# Patient Record
Sex: FEMALE | Race: WHITE | NOT HISPANIC OR LATINO | Employment: STUDENT | URBAN - METROPOLITAN AREA
[De-identification: names, ages, dates, MRNs, and addresses within clinical notes are randomized per-mention and may not be internally consistent; named-entity substitution may affect disease eponyms.]

---
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
## 2022-01-13 ENCOUNTER — PATIENT MESSAGE (OUTPATIENT)
Dept: ADMINISTRATIVE | Facility: OTHER | Age: 22
End: 2022-01-13
Payer: COMMERCIAL

## 2022-01-28 ENCOUNTER — OFFICE VISIT (OUTPATIENT)
Dept: PRIMARY CARE CLINIC | Facility: CLINIC | Age: 22
End: 2022-01-28
Payer: COMMERCIAL

## 2022-01-28 VITALS
RESPIRATION RATE: 17 BRPM | SYSTOLIC BLOOD PRESSURE: 105 MMHG | BODY MASS INDEX: 21.8 KG/M2 | HEIGHT: 67 IN | TEMPERATURE: 98 F | HEART RATE: 72 BPM | DIASTOLIC BLOOD PRESSURE: 70 MMHG | WEIGHT: 138.88 LBS | OXYGEN SATURATION: 97 %

## 2022-01-28 DIAGNOSIS — Z00.00 ANNUAL PHYSICAL EXAM: Primary | ICD-10-CM

## 2022-01-28 PROCEDURE — 99999 PR PBB SHADOW E&M-EST. PATIENT-LVL IV: ICD-10-PCS | Mod: PBBFAC,,, | Performed by: FAMILY MEDICINE

## 2022-01-28 PROCEDURE — 3074F SYST BP LT 130 MM HG: CPT | Mod: CPTII,S$GLB,, | Performed by: FAMILY MEDICINE

## 2022-01-28 PROCEDURE — 3074F PR MOST RECENT SYSTOLIC BLOOD PRESSURE < 130 MM HG: ICD-10-PCS | Mod: CPTII,S$GLB,, | Performed by: FAMILY MEDICINE

## 2022-01-28 PROCEDURE — 3008F PR BODY MASS INDEX (BMI) DOCUMENTED: ICD-10-PCS | Mod: CPTII,S$GLB,, | Performed by: FAMILY MEDICINE

## 2022-01-28 PROCEDURE — 99999 PR PBB SHADOW E&M-EST. PATIENT-LVL IV: CPT | Mod: PBBFAC,,, | Performed by: FAMILY MEDICINE

## 2022-01-28 PROCEDURE — 1159F MED LIST DOCD IN RCRD: CPT | Mod: CPTII,S$GLB,, | Performed by: FAMILY MEDICINE

## 2022-01-28 PROCEDURE — 3078F DIAST BP <80 MM HG: CPT | Mod: CPTII,S$GLB,, | Performed by: FAMILY MEDICINE

## 2022-01-28 PROCEDURE — 3078F PR MOST RECENT DIASTOLIC BLOOD PRESSURE < 80 MM HG: ICD-10-PCS | Mod: CPTII,S$GLB,, | Performed by: FAMILY MEDICINE

## 2022-01-28 PROCEDURE — 99385 PR PREVENTIVE VISIT,NEW,18-39: ICD-10-PCS | Mod: S$GLB,,, | Performed by: FAMILY MEDICINE

## 2022-01-28 PROCEDURE — 1159F PR MEDICATION LIST DOCUMENTED IN MEDICAL RECORD: ICD-10-PCS | Mod: CPTII,S$GLB,, | Performed by: FAMILY MEDICINE

## 2022-01-28 PROCEDURE — 3008F BODY MASS INDEX DOCD: CPT | Mod: CPTII,S$GLB,, | Performed by: FAMILY MEDICINE

## 2022-01-28 PROCEDURE — 99385 PREV VISIT NEW AGE 18-39: CPT | Mod: S$GLB,,, | Performed by: FAMILY MEDICINE

## 2022-01-28 RX ORDER — TRIAMCINOLONE ACETONIDE 1 MG/G
OINTMENT TOPICAL 2 TIMES DAILY
Qty: 30 G | Refills: 1 | Status: SHIPPED | OUTPATIENT
Start: 2022-01-28

## 2022-01-28 NOTE — PATIENT INSTRUCTIONS
1. Get fasting labs  2. Core strengthening as discussed (dead bug and side planks)  3. Compound W on bottom of your foot and steroid ointment with adhesive pad - contact me in 2 weeks with effect.

## 2022-01-28 NOTE — PROGRESS NOTES
[facility] Note    Chief Complaint      Chief Complaint   Patient presents with    Establish Care       History of Present Illness      Tiffani Cleveland is a 21 y.o. female with no chronic conditions of who presents today for:  Annual    Works at [employer]  Not sexually active  Hx PCOS, was irregular (menses [id number]) started OCP, having regular period (monthly lasting 7 days), got off OCP and had monthly menses that only lasted 4 days.  Now since college and new job has had anovulatory cycles.       Immunizations:   COVID- last dose 4/21, will get boost but not now   Flu- due   Tetanus - 2013    Activity - works at [employer], student [employer]  Diet- skip breakfast, lunch sporadic, dinner salmon, chicken - broccoli/cauliflower  Water - 60oz    Umbilical as toddler, had painful defect in abdomen 2 weeks and was concerned for hernia. Pain resolved and she thinks defect has resolved.  BM and urine are ok.     2 concerning foot lesions            Patient Care Team:  Joleen Santillan MD as PCP - General (Internal Medicine)     Health Maintenance:  Immunization History   Administered Date(s) Administered    COVID-19, MRNA, LN-S, PF (MODERNA FULL 0.5 ML DOSE) 03/29/2021, 04/28/2021    DTaP 2002, 01/27/2003, 03/21/2003, 12/12/2003, 10/06/2006    HIB 2002, 01/27/2003, 03/21/2003, 12/12/2003    HPV Quadrivalent 09/30/2013, 12/20/2013, 05/16/2014    Hepatitis A, Pediatric/Adolescent, 2 Dose 08/11/2017, 06/25/2020    Hepatitis B, Pediatric/Adolescent 2002, 2002, 03/21/2003    IPV 2002, 01/27/2003, 12/12/2003, 10/06/2006    Influenza - Intranasal 10/01/2008, 11/16/2009    Influenza - Quadrivalent - PF *Preferred* (6 months and older) 10/08/2007, 02/08/2011, 09/28/2012, 09/30/2013    Influenza A (H1N1) 2009 Monovalent - IM 11/16/2009    MMR 09/29/2003    MMRV 10/06/2006    Meningococcal Conjugate 09/30/2013    Meningococcal Conjugate (MCV4P) 06/25/2020    Pneumococcal Conjugate  "- 7 Valent 2002, 01/27/2003, 03/21/2003, 09/29/2003    Tdap 09/30/2013    Varicella 09/29/2003      Health Maintenance   Topic Date Due    Chlamydia Screening  10/04/2019    TETANUS VACCINE  09/30/2023    Hepatitis C Screening  Completed    Lipid Panel  Completed    HPV Vaccines  Completed        Past Medical History:  Past Medical History:   Diagnosis Date    History of PCOS 10/8/2018       Past Surgical History:   has a past surgical history that includes Adenoidectomy; Tonsillectomy (18 mo); Umbilical hernia repair (6yo); and [place] tooth extraction.    Family History:  family history includes Asthma in her father; Breast cancer in her other; Cancer in her paternal grandfather; Depression in her maternal grandfather, maternal grandmother, and paternal grandfather; Heart disease in her maternal grandmother; Hypertension in her maternal grandfather, maternal grandmother, and paternal grandmother; Polycystic ovary syndrome in her maternal aunt and other.     Social History:  Social History     Tobacco Use    Smoking status: Never Smoker    Smokeless tobacco: Never Used   Substance Use Topics    Alcohol use: No    Drug use: No       Review of Systems   Constitutional: Negative for fever.   Respiratory: Negative for shortness of breath.    Cardiovascular: Negative for chest pain.   Gastrointestinal: Negative for change in bowel habit and change in bowel habit.   Genitourinary: Negative for difficulty urinating.        Medications:    Current Outpatient Medications:     triamcinolone acetonide 0.1% (KENALOG) 0.1 % ointment, Apply topically 2 (two) times daily., Disp: 30 g, Rfl: 1     Allergies:  Review of patient's allergies indicates:   Allergen Reactions    Shrimp Swelling     Class 2       Physical Exam      Vital Signs  Temp: 98 °F (36.7 °C)  Pulse: 72  Resp: 17  SpO2: 97 %  BP: 105/70  BP Location: Left arm  Patient Position: Sitting  Pain Score: 0-No pain  Height and Weight  Height: 5' 7" " (170.2 cm)  Weight: 63 kg (138 lb 14.2 oz)  BSA (Calculated - sq m): 1.73 sq meters  BMI (Calculated): 21.7  Weight in (lb) to have BMI = 25: 159.3      Patient Position: Sitting      Physical Exam  Vitals reviewed.   Constitutional:       General: She is not in acute distress.     Appearance: Normal appearance.   HENT:      Right Ear: Tympanic membrane, ear canal and external ear normal.      Left Ear: Tympanic membrane, ear canal and external ear normal.      Mouth/Throat:      Mouth: Mucous membranes are moist.      Pharynx: Oropharynx is clear. No oropharyngeal exudate or posterior oropharyngeal erythema.   Eyes:      Extraocular Movements: Extraocular movements intact.      Conjunctiva/sclera: Conjunctivae normal.      Pupils: Pupils are equal, round, and reactive to light.   Cardiovascular:      Rate and Rhythm: Normal rate and regular rhythm.      Pulses: Normal pulses.      Heart sounds: No murmur heard.  No friction rub. No gallop.    Pulmonary:      Effort: Pulmonary effort is normal.      Breath sounds: No wheezing, rhonchi or rales.   Abdominal:      General: Abdomen is flat. Bowel sounds are normal. There is no distension.      Palpations: Abdomen is soft. There is no mass.      Tenderness: There is no abdominal tenderness.      Hernia: No hernia is present.      Comments: She does have ~2in palpablesupraumbilical diastasis   Musculoskeletal:      Cervical back: Neck supple.      Right lower leg: No edema.      Left lower leg: No edema.   Feet:      Comments: Right foot with circular lesion at plantar surface 1st MTP consistent with plantar wart that has been debrided vs callus.  Seemed too circular for that.  Right dorsl surface 3rd DIP with linear callus/contact irritation.  Lymphadenopathy:      Cervical: No cervical adenopathy.   Skin:     General: Skin is warm and dry.   Neurological:      General: No focal deficit present.      Mental Status: She is alert.   Psychiatric:         Mood and Affect:  Mood normal.         Behavior: Behavior normal.          Laboratory:  CBC:        CMP:            URINALYSIS:         LIPIDS:        TSH:        A1C:        Urine Microalbumin/Cr:          Other:             Assessment/Plan     Tiffani Cleveland is a 21 y.o.female with:    Annual physical exam  -     CBC Auto Differential; Future; Expected date: 01/28/2022  -     Comprehensive Metabolic Panel; Future; Expected date: 01/28/2022  -     Lipid Panel; Future; Expected date: 01/28/2022  Physical activity, diet, healthy lifestyle, alcohol, tobacco, screenings and immunizations discussed.    Plantar wart - compound W  Contact irritation/callus - pad and steroid cream    Other orders  -     triamcinolone acetonide 0.1% (KENALOG) 0.1 % ointment; Apply topically 2 (two) times daily.  Dispense: 30 g; Refill: 1         Chronic conditions status updated as per HPI.  Other than changes above, cont current medications and maintain follow up with specialists.  Return to clinic in Follow up in about 2 weeks (around 2/11/2022), or if symptoms worsen or fail to improve, for Otherwise, follow-up in 1 year for annual.      Joleen Santillan MD  [facility]

## 2023-01-25 ENCOUNTER — PATIENT MESSAGE (OUTPATIENT)
Dept: ADMINISTRATIVE | Facility: HOSPITAL | Age: 23
End: 2023-01-25
Payer: COMMERCIAL